# Patient Record
Sex: FEMALE | Race: WHITE
[De-identification: names, ages, dates, MRNs, and addresses within clinical notes are randomized per-mention and may not be internally consistent; named-entity substitution may affect disease eponyms.]

---

## 2022-09-15 ENCOUNTER — HOSPITAL ENCOUNTER (EMERGENCY)
Dept: HOSPITAL 97 - ER | Age: 37
Discharge: HOME | End: 2022-09-15
Payer: SELF-PAY

## 2022-09-15 DIAGNOSIS — U07.1: Primary | ICD-10-CM

## 2022-09-15 DIAGNOSIS — F17.210: ICD-10-CM

## 2022-09-15 PROCEDURE — 87804 INFLUENZA ASSAY W/OPTIC: CPT

## 2022-09-15 PROCEDURE — 87070 CULTURE OTHR SPECIMN AEROBIC: CPT

## 2022-09-15 PROCEDURE — 99283 EMERGENCY DEPT VISIT LOW MDM: CPT

## 2022-09-15 PROCEDURE — 87081 CULTURE SCREEN ONLY: CPT

## 2022-09-15 NOTE — EDPHYS
Physician Documentation                                                                           

 [facility]                                                                 

Name: Reina Starkey                                                                             

Age: 37 yrs                                                                                       

Sex: Female                                                                                       

: 1985                                                                                   

MRN: [id number]                                                                                   

Arrival Date: 09/15/2022                                                                          

Time: 19:15                                                                                       

Account#: [id number]                                                                            

Bed Waiting                                                                                       

Private MD:                                                                                       

ED Physician Charlie Nuñez                                                                         

HPI:                                                                                              

09/15                                                                                             

20:50 This 37 yrs old Female presents to ER via Ambulatory with complaints of Cough,          kb  

      Congestion, Sore Throat, COVID + ON HOME TEST.                                              

20:50 The patient or guardian reports cough, that is intermittent, described as mild. Onset:  kb  

      The symptoms/episode began/occurred this morning. Severity of symptoms: At their worst      

      the symptoms were moderate, in the emergency department the symptoms are unchanged.         

      Modifying factors: The symptoms are alleviated by nothing, the symptoms are aggravated      

      by nothing. Associated signs and symptoms: Pertinent positives: rhinorrhea, sore            

      throat. The patient has not experienced similar symptoms in the past. The patient has       

      not recently seen a physician. Pt states she started feeling bad this morning with          

      cough, congestion and sore throat. Took a home covid test that was positive. Came in to     

      get another covid test done because her work requires an "official" one.                    

                                                                                                  

OB/GYN:                                                                                           

19:24 LMP 2022                                                                            tw5 

                                                                                                  

Historical:                                                                                       

- Allergies:                                                                                      

19:24 tramadol;                                                                               tw5 

- PMHx:                                                                                           

19:24 Anxiety; Depression; Hypertension; Hypothyroidism;                                      tw5 

                                                                                                  

- Immunization history:: Flu vaccine is not up to date.                                           

- Social history:: Smoking status: Patient reports the use of cigarette tobacco                   

  products, smokes one-half pack cigarettes per day, Reported history of juuling and/or           

  vaping.                                                                                         

                                                                                                  

                                                                                                  

ROS:                                                                                              

20:52 Abdomen/GI: Negative for abdominal pain, nausea, vomiting, diarrhea, and constipation.  kb  

20:52 Constitutional: Positive for fatigue, malaise.                                              

20:52 ENT: Positive for rhinorrhea, sinus congestion, sore throat.                                

20:52 Respiratory: Positive for cough.                                                            

20:52 All other systems are negative.                                                             

                                                                                                  

Exam:                                                                                             

20:52 Constitutional:  This is a well developed, well nourished patient who is awake, alert,  kb  

      and in no acute distress. Head/Face:  Normocephalic, atraumatic. ENT:  Moist Mucous         

      membranes Cardiovascular:  Regular rate and rhythm with a normal S1 and S2.  No             

      gallops, murmurs, or rubs.  No pulse deficits. Respiratory:  Respirations even and          

      unlabored. No increased work of breathing. Talking in full sentences Abdomen/GI:  Soft,     

      non-tender. No distention Skin:  Warm, dry with normal turgor.  Normal color. MS/           

      Extremity:  Pulses equal, no cyanosis.  Neurovascular intact.  Full, normal range of        

      motion. Neuro:  Awake and alert, GCS 15, oriented to person, place, time, and               

      situation. Moves all extremities. Normal gait. Psych:  Awake, alert, with orientation       

      to person, place and time.  Behavior, mood, and affect are within normal limits.            

                                                                                                  

Vital Signs:                                                                                      

19:22  / 90; Pulse 104; Resp 18; Temp 98.6(T); Pulse Ox 100% on R/A; Weight 96.62 kg;   tw5 

      Height 5 ft. 7 in. (170.18 cm); Pain 9/10;                                                  

19:22 Body Mass Index 33.36 (96.62 kg, 170.18 cm)                                             tw5 

19:22 Pain is a headache                                                                      tw5 

                                                                                                  

MDM:                                                                                              

19:27 Patient medically screened.                                                             kb  

20:42 Data reviewed: vital signs, nurses notes. Data interpreted: Pulse oximetry: on room air kb  

      is 100 %. Interpretation: normal. Counseling: I had a detailed discussion with the          

      patient and/or guardian regarding: the historical points, exam findings, and any            

      diagnostic results supporting the discharge/admit diagnosis, lab results, the need for      

      outpatient follow up, a family practitioner, to return to the emergency department if       

      symptoms worsen or persist or if there are any questions or concerns that arise at home.    

                                                                                                  

09/15                                                                                             

19:26 Order name: COVID-19 SARS RT PCR (Document "Date of Onset" if Symptomatic); Complete    tw5 

      Time: 20:45                                                                                 

09/15                                                                                             

19:26 Order name: Flu; Complete Time: 20:11                                                   tw5 

09/15                                                                                             

19:26 Order name: Strep; Complete Time: 20:07                                                 tw5 

09/15                                                                                             

20:08 Order name: Throat Culture                                                              EDMS

                                                                                                  

Administered Medications:                                                                         

No medications were administered                                                                  

                                                                                                  

                                                                                                  

Disposition:                                                                                      

22:49 Co-signature as Attending Physician, Charlie Nuñez MD.                                    rn  

                                                                                                  

Disposition Summary:                                                                              

09/15/22 20:43                                                                                    

Discharge Ordered                                                                                 

      Location: Home                                                                          kb  

      Condition: Stable                                                                       kb  

      Diagnosis                                                                                   

        - SARS-associated coronavirus as the cause of diseases classified elsewhere           kb  

      Followup:                                                                               kb  

        - With: Emergency Department                                                               

        - When: As needed                                                                          

        - Reason: Worsening of condition                                                           

      Followup:                                                                               kb  

        - With: Private Physician                                                                  

        - When: 2 - 3 days                                                                         

        - Reason: Recheck today's complaints, Continuance of care, Re-evaluation by your           

      physician                                                                                   

      Discharge Instructions:                                                                     

        - Discharge Summary Sheet                                                             kb  

        - COVID-19                                                                            kb  

        - Viral Illness, Adult                                                                kb  

      Forms:                                                                                      

        - Medication Reconciliation Form                                                      kb  

        - Thank You Letter                                                                    kb  

        - Antibiotic Education                                                                kb  

        - Prescription Opioid Use                                                             kb  

      Prescriptions:                                                                              

        - Paxlovid (EUA) 150 mg x 2- 100 mg Oral tablet                                            

            - take 3 tablet by ORAL route 2 times per day for 5 days per package directions;  kb  

      30 tablet; Refills: 0, Product Selection Permitted                                          

Signatures:                                                                                       

Dispatcher [employer]                           EDMS                                                 

Shayna Suoth, JTC                 HOWARDP-Charlie Cheng MD MD rn Wood, Tiffany                                tw5                                                  

                                                                                                  

**************************************************************************************************

## 2022-09-15 NOTE — ER
Nurse's Notes                                                                                     

 [facility]                                                                 

Name: Reina Starkey                                                                             

Age: 37 yrs                                                                                       

Sex: Female                                                                                       

: 1985                                                                                   

MRN: [id number]                                                                                   

Arrival Date: 09/15/2022                                                                          

Time: 19:15                                                                                       

Account#: [id number]                                                                            

Bed Waiting                                                                                       

Private MD:                                                                                       

Diagnosis: SARS-associated coronavirus as the cause of diseases classified elsewhere              

                                                                                                  

Presentation:                                                                                     

09/15                                                                                             

19:22 Chief complaint: Patient states: "I took a home test for covid and it was positive. I   tw5 

      just started to feel bad last night. I work at [employer] and one of the residences       

      tested positive on Tuesday.". Coronavirus screen: Vaccine status: Patient reports           

      receiving the 1st dose of the Covid vaccine. Moderna. Ebola Screen: Patient negative        

      for fever greater than or equal to 101.5 degrees Fahrenheit, and additional compatible      

      Ebola Virus Disease symptoms Patient denies exposure to infectious person. Patient          

      denies travel to an Ebola-affected area in the 21 days before illness onset. Initial        

      Sepsis Screen: Does the patient meet any 2 criteria? HR > 90 bpm. Does the patient have     

      a suspected source of infection? No. Patient's initial sepsis screen is negative. Risk      

      Assessment: Do you want to hurt yourself or someone else? Patient reports no desire to      

      harm self or others. Onset of symptoms was 2022 at 20:00.                     

19:22 Method Of Arrival: Ambulatory                                                           tw5 

19:22 Acuity: SAMEERA 4                                                                           tw5 

                                                                                                  

Triage Assessment:                                                                                

19:24 General: Appears in no apparent distress. Behavior is calm, cooperative, appropriate    tw5 

      for age. Pain: Pain currently is 9 out of 10 on a pain scale. Respiratory:.                 

                                                                                                  

OB/GYN:                                                                                           

19:24 LMP 2022                                                                            tw5 

                                                                                                  

Historical:                                                                                       

- Allergies:                                                                                      

19:24 tramadol;                                                                               tw5 

- PMHx:                                                                                           

19:24 Anxiety; Depression; Hypertension; Hypothyroidism;                                      tw5 

                                                                                                  

- Immunization history:: Flu vaccine is not up to date.                                           

- Social history:: Smoking status: Patient reports the use of cigarette tobacco                   

  products, smokes one-half pack cigarettes per day, Reported history of juuling and/or           

  vaping.                                                                                         

                                                                                                  

                                                                                                  

Screenin:25 Abuse screen: Denies threats or abuse. Denies injuries from another. Nutritional        tw5 

      screening: No deficits noted. Tuberculosis screening: No symptoms or risk factors           

      identified. Fall Risk None identified.                                                      

                                                                                                  

Assessment:                                                                                       

19:31 General: Appears in no apparent distress. Cardiovascular: Capillary refill < 3 seconds. tw5 

      Respiratory: Airway is patent Breath sounds are clear bilaterally.                          

19:31 General: Reports "Just of where I work I need a doctors note confirming that I have     tw5 

      covid.".                                                                                    

                                                                                                  

Vital Signs:                                                                                      

19:22  / 90; Pulse 104; Resp 18; Temp 98.6(T); Pulse Ox 100% on R/A; Weight 96.62 kg;   tw5 

      Height 5 ft. 7 in. (170.18 cm); Pain 9/10;                                                  

19:22 Body Mass Index 33.36 (96.62 kg, 170.18 cm)                                             tw5 

19:22 Pain is a headache                                                                      tw5 

                                                                                                  

ED Course:                                                                                        

19:15 Patient arrived in ED.                                                                  jj6 

19:24 Triage completed.                                                                       tw5 

19:24 [facility]-C is [facility]                                                        kb  

19:24 Charlie Nuñez MD is Attending Physician.                                                kb  

19:24 Arm band placed on right wrist.                                                         tw5 

19:25 No provider procedures requiring assistance completed.                                  tw5 

19:31 Awaiting lab results.                                                                   tw5 

19:31 Patient has correct armband on for positive identification.                             tw5 

19:31 Strep Sent.                                                                             tw5 

19:31 Flu Sent.                                                                               tw5 

19:31 COVID-19 SARS RT PCR (Document "Date of Onset" if Symptomatic) Sent.                    tw5 

19:31 COVID swab sent to lab. Flu and/or RSV swab sent to lab. Strep swab sent to lab.        tw5 

19:31 Patient did not have IV access during this emergency room visit.                        tw5 

20:27 Throat Culture Sent.                                                                    tw5 

20:38 COVID-19 SARS RT PCR (Document "Date of Onset" if Symptomatic) Sent.                    tw5 

                                                                                                  

Administered Medications:                                                                         

No medications were administered                                                                  

                                                                                                  

                                                                                                  

Medication:                                                                                       

19:31 VIS not applicable for this client.                                                     tw5 

                                                                                                  

Outcome:                                                                                          

20:43 Discharge ordered by MD.                                                                kb  

21:14 Discharged to home ambulatory.                                                          tw5 

21:14 Condition: stable                                                                           

21:14 Discharge instructions given to patient, Instructed on discharge instructions, follow       

      up and referral plans. medication usage, Demonstrated understanding of instructions,        

      follow-up care, medications.                                                                

21:14 Patient left the ED.                                                                    tw5 

                                                                                                  

Signatures:                                                                                       

Shayna South FNP-C FNP-Clemencia Tolentino                                tw5                                                  

Adilia, Trudy                           jj6                                                  

                                                                                                  

**************************************************************************************************

## 2022-09-17 VITALS — DIASTOLIC BLOOD PRESSURE: 90 MMHG | SYSTOLIC BLOOD PRESSURE: 126 MMHG | OXYGEN SATURATION: 100 % | TEMPERATURE: 98.6 F
